# Patient Record
Sex: FEMALE | Race: BLACK OR AFRICAN AMERICAN | NOT HISPANIC OR LATINO
[De-identification: names, ages, dates, MRNs, and addresses within clinical notes are randomized per-mention and may not be internally consistent; named-entity substitution may affect disease eponyms.]

---
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
## 2020-10-20 ENCOUNTER — EMERGENCY (EMERGENCY)
Facility: HOSPITAL | Age: 20
LOS: 1 days | Discharge: HOME | End: 2020-10-20
Attending: STUDENT IN AN ORGANIZED HEALTH CARE EDUCATION/TRAINING PROGRAM
Payer: COMMERCIAL

## 2020-10-20 VITALS
HEART RATE: 116 BPM | DIASTOLIC BLOOD PRESSURE: 71 MMHG | SYSTOLIC BLOOD PRESSURE: 111 MMHG | RESPIRATION RATE: 22 BRPM | HEIGHT: 62.5 IN | TEMPERATURE: 101 F | OXYGEN SATURATION: 98 % | WEIGHT: 151.9 LBS

## 2020-10-20 VITALS
HEART RATE: 96 BPM | DIASTOLIC BLOOD PRESSURE: 78 MMHG | OXYGEN SATURATION: 100 % | SYSTOLIC BLOOD PRESSURE: 130 MMHG | RESPIRATION RATE: 18 BRPM

## 2020-10-20 LAB
ALBUMIN SERPL ELPH-MCNC: 4 G/DL — SIGNIFICANT CHANGE UP (ref 3.3–5)
ALP SERPL-CCNC: 88 U/L — SIGNIFICANT CHANGE UP (ref 40–120)
ALT FLD-CCNC: 21 U/L — SIGNIFICANT CHANGE UP (ref 10–45)
ANION GAP SERPL CALC-SCNC: 16 MMOL/L — SIGNIFICANT CHANGE UP (ref 5–17)
APPEARANCE UR: ABNORMAL
AST SERPL-CCNC: 24 U/L — SIGNIFICANT CHANGE UP (ref 10–40)
BACTERIA # UR AUTO: ABNORMAL
BASE EXCESS BLDV CALC-SCNC: 7 MMOL/L — HIGH (ref -2–2)
BASOPHILS # BLD AUTO: 0.04 K/UL — SIGNIFICANT CHANGE UP (ref 0–0.2)
BASOPHILS NFR BLD AUTO: 0.3 % — SIGNIFICANT CHANGE UP (ref 0–2)
BILIRUB SERPL-MCNC: 0.5 MG/DL — SIGNIFICANT CHANGE UP (ref 0.2–1.2)
BILIRUB UR-MCNC: NEGATIVE — SIGNIFICANT CHANGE UP
BUN SERPL-MCNC: <4 MG/DL — LOW (ref 7–23)
CA-I SERPL-SCNC: 1.13 MMOL/L — SIGNIFICANT CHANGE UP (ref 1.12–1.3)
CALCIUM SERPL-MCNC: 10.2 MG/DL — SIGNIFICANT CHANGE UP (ref 8.4–10.5)
CHLORIDE BLDV-SCNC: 97 MMOL/L — SIGNIFICANT CHANGE UP (ref 96–108)
CHLORIDE SERPL-SCNC: 94 MMOL/L — LOW (ref 96–108)
CO2 BLDV-SCNC: 30 MMOL/L — SIGNIFICANT CHANGE UP (ref 22–30)
CO2 SERPL-SCNC: 25 MMOL/L — SIGNIFICANT CHANGE UP (ref 22–31)
COLOR SPEC: SIGNIFICANT CHANGE UP
CREAT SERPL-MCNC: 0.8 MG/DL — SIGNIFICANT CHANGE UP (ref 0.5–1.3)
DIFF PNL FLD: NEGATIVE — SIGNIFICANT CHANGE UP
EOSINOPHIL # BLD AUTO: 0.23 K/UL — SIGNIFICANT CHANGE UP (ref 0–0.5)
EOSINOPHIL NFR BLD AUTO: 2 % — SIGNIFICANT CHANGE UP (ref 0–6)
EPI CELLS # UR: 4 /HPF — SIGNIFICANT CHANGE UP
GAS PNL BLDV: 133 MMOL/L — LOW (ref 135–145)
GAS PNL BLDV: SIGNIFICANT CHANGE UP
GAS PNL BLDV: SIGNIFICANT CHANGE UP
GLUCOSE BLDV-MCNC: 116 MG/DL — HIGH (ref 70–99)
GLUCOSE SERPL-MCNC: 108 MG/DL — HIGH (ref 70–99)
GLUCOSE UR QL: NEGATIVE — SIGNIFICANT CHANGE UP
HCG UR QL: NEGATIVE — SIGNIFICANT CHANGE UP
HCO3 BLDV-SCNC: 29 MMOL/L — SIGNIFICANT CHANGE UP (ref 21–29)
HCT VFR BLD CALC: 41.2 % — SIGNIFICANT CHANGE UP (ref 34.5–45)
HCT VFR BLDA CALC: 41 % — SIGNIFICANT CHANGE UP (ref 39–50)
HGB BLD CALC-MCNC: 13.5 G/DL — SIGNIFICANT CHANGE UP (ref 11.5–15.5)
HGB BLD-MCNC: 13.7 G/DL — SIGNIFICANT CHANGE UP (ref 11.5–15.5)
IMM GRANULOCYTES NFR BLD AUTO: 0.5 % — SIGNIFICANT CHANGE UP (ref 0–1.5)
KETONES UR-MCNC: ABNORMAL
LACTATE BLDV-MCNC: 2 MMOL/L — SIGNIFICANT CHANGE UP (ref 0.7–2)
LEUKOCYTE ESTERASE UR-ACNC: NEGATIVE — SIGNIFICANT CHANGE UP
LIDOCAIN IGE QN: 15 U/L — SIGNIFICANT CHANGE UP (ref 7–60)
LYMPHOCYTES # BLD AUTO: 1.53 K/UL — SIGNIFICANT CHANGE UP (ref 1–3.3)
LYMPHOCYTES # BLD AUTO: 13.3 % — SIGNIFICANT CHANGE UP (ref 13–44)
MCHC RBC-ENTMCNC: 28.2 PG — SIGNIFICANT CHANGE UP (ref 27–34)
MCHC RBC-ENTMCNC: 33.3 GM/DL — SIGNIFICANT CHANGE UP (ref 32–36)
MCV RBC AUTO: 84.9 FL — SIGNIFICANT CHANGE UP (ref 80–100)
MONOCYTES # BLD AUTO: 0.34 K/UL — SIGNIFICANT CHANGE UP (ref 0–0.9)
MONOCYTES NFR BLD AUTO: 2.9 % — SIGNIFICANT CHANGE UP (ref 2–14)
NEUTROPHILS # BLD AUTO: 9.34 K/UL — HIGH (ref 1.8–7.4)
NEUTROPHILS NFR BLD AUTO: 81 % — HIGH (ref 43–77)
NITRITE UR-MCNC: NEGATIVE — SIGNIFICANT CHANGE UP
NRBC # BLD: 0 /100 WBCS — SIGNIFICANT CHANGE UP (ref 0–0)
PCO2 BLDV: 33 MMHG — LOW (ref 35–50)
PH BLDV: 7.55 — HIGH (ref 7.35–7.45)
PH UR: 7 — SIGNIFICANT CHANGE UP (ref 5–8)
PLATELET # BLD AUTO: 440 K/UL — HIGH (ref 150–400)
PO2 BLDV: 26 MMHG — SIGNIFICANT CHANGE UP (ref 25–45)
POTASSIUM BLDV-SCNC: 3.5 MMOL/L — SIGNIFICANT CHANGE UP (ref 3.5–5.3)
POTASSIUM SERPL-MCNC: 3.6 MMOL/L — SIGNIFICANT CHANGE UP (ref 3.5–5.3)
POTASSIUM SERPL-SCNC: 3.6 MMOL/L — SIGNIFICANT CHANGE UP (ref 3.5–5.3)
PROT SERPL-MCNC: 8.4 G/DL — HIGH (ref 6–8.3)
PROT UR-MCNC: SIGNIFICANT CHANGE UP
RBC # BLD: 4.85 M/UL — SIGNIFICANT CHANGE UP (ref 3.8–5.2)
RBC # FLD: 13.4 % — SIGNIFICANT CHANGE UP (ref 10.3–14.5)
RBC CASTS # UR COMP ASSIST: 2 /HPF — SIGNIFICANT CHANGE UP (ref 0–4)
SAO2 % BLDV: 54 % — LOW (ref 67–88)
SARS-COV-2 RNA SPEC QL NAA+PROBE: SIGNIFICANT CHANGE UP
SODIUM SERPL-SCNC: 135 MMOL/L — SIGNIFICANT CHANGE UP (ref 135–145)
SP GR SPEC: 1.01 — LOW (ref 1.01–1.02)
UROBILINOGEN FLD QL: NEGATIVE — SIGNIFICANT CHANGE UP
WBC # BLD: 11.54 K/UL — HIGH (ref 3.8–10.5)
WBC # FLD AUTO: 11.54 K/UL — HIGH (ref 3.8–10.5)
WBC UR QL: 5 /HPF — SIGNIFICANT CHANGE UP (ref 0–5)

## 2020-10-20 PROCEDURE — 81025 URINE PREGNANCY TEST: CPT

## 2020-10-20 PROCEDURE — U0003: CPT

## 2020-10-20 PROCEDURE — 84295 ASSAY OF SERUM SODIUM: CPT

## 2020-10-20 PROCEDURE — 85018 HEMOGLOBIN: CPT

## 2020-10-20 PROCEDURE — 99284 EMERGENCY DEPT VISIT MOD MDM: CPT | Mod: 25

## 2020-10-20 PROCEDURE — 82947 ASSAY GLUCOSE BLOOD QUANT: CPT

## 2020-10-20 PROCEDURE — 99284 EMERGENCY DEPT VISIT MOD MDM: CPT

## 2020-10-20 PROCEDURE — 80053 COMPREHEN METABOLIC PANEL: CPT

## 2020-10-20 PROCEDURE — 83605 ASSAY OF LACTIC ACID: CPT

## 2020-10-20 PROCEDURE — 81001 URINALYSIS AUTO W/SCOPE: CPT

## 2020-10-20 PROCEDURE — 82330 ASSAY OF CALCIUM: CPT

## 2020-10-20 PROCEDURE — 85025 COMPLETE CBC W/AUTO DIFF WBC: CPT

## 2020-10-20 PROCEDURE — 83690 ASSAY OF LIPASE: CPT

## 2020-10-20 PROCEDURE — 84132 ASSAY OF SERUM POTASSIUM: CPT

## 2020-10-20 PROCEDURE — 71045 X-RAY EXAM CHEST 1 VIEW: CPT | Mod: 26

## 2020-10-20 PROCEDURE — 85014 HEMATOCRIT: CPT

## 2020-10-20 PROCEDURE — 71045 X-RAY EXAM CHEST 1 VIEW: CPT

## 2020-10-20 PROCEDURE — 82435 ASSAY OF BLOOD CHLORIDE: CPT

## 2020-10-20 PROCEDURE — 82803 BLOOD GASES ANY COMBINATION: CPT

## 2020-10-20 RX ORDER — ACETAMINOPHEN 500 MG
650 TABLET ORAL ONCE
Refills: 0 | Status: COMPLETED | OUTPATIENT
Start: 2020-10-20 | End: 2020-10-20

## 2020-10-20 RX ORDER — IBUPROFEN 200 MG
600 TABLET ORAL ONCE
Refills: 0 | Status: COMPLETED | OUTPATIENT
Start: 2020-10-20 | End: 2020-10-20

## 2020-10-20 RX ORDER — SODIUM CHLORIDE 9 MG/ML
1000 INJECTION, SOLUTION INTRAVENOUS ONCE
Refills: 0 | Status: COMPLETED | OUTPATIENT
Start: 2020-10-20 | End: 2020-10-20

## 2020-10-20 RX ORDER — ONDANSETRON 8 MG/1
4 TABLET, FILM COATED ORAL ONCE
Refills: 0 | Status: COMPLETED | OUTPATIENT
Start: 2020-10-20 | End: 2020-10-20

## 2020-10-20 RX ADMIN — Medication 650 MILLIGRAM(S): at 17:24

## 2020-10-20 RX ADMIN — Medication 650 MILLIGRAM(S): at 20:33

## 2020-10-20 RX ADMIN — Medication 600 MILLIGRAM(S): at 20:38

## 2020-10-20 RX ADMIN — ONDANSETRON 4 MILLIGRAM(S): 8 TABLET, FILM COATED ORAL at 17:24

## 2020-10-20 RX ADMIN — SODIUM CHLORIDE 2000 MILLILITER(S): 9 INJECTION, SOLUTION INTRAVENOUS at 20:25

## 2020-10-20 RX ADMIN — Medication 1 TABLET(S): at 21:16

## 2020-10-20 NOTE — ED PROVIDER NOTE - CLINICAL SUMMARY MEDICAL DECISION MAKING FREE TEXT BOX
DAVID Elliott: 21yo F, daily marijuana smoker, no other significant PMH/PSH hx p/w body aches, fever/chills, cough with SOB, poor appetite, n/v/d x 5 days. No sick contacts or recent travel. No abd pain. Recent negative COVID test a few days ago. Pt tachycardic + febrile upon arrival to ED, temp improved with tylenol by [facility]. Exam unremarkable except for dry oral mucosa. Consistent with viral syndrome. Plan for PO hydration and send COVID, awaiting labs and cxr sent by [facility], re-eval. DAVID Elliott: 21yo F, daily marijuana smoker, no other significant PMH/PSH hx p/w body aches, fever/chills, cough with SOB, poor appetite, n/v/d x 5 days. No sick contacts or recent travel. No abd pain. Recent negative COVID test a few days ago. Pt tachycardic + febrile upon arrival to ED, temp improved with tylenol by [facility]. Exam unremarkable except for dry oral mucosa. Consistent with viral syndrome. Plan for PO hydration and send COVID, awaiting labs and cxr ordered by [facility], re-eval.

## 2020-10-20 NOTE — ED PROVIDER NOTE - NSFOLLOWUPINSTRUCTIONS_ED_ALL_ED_FT
Rest. Stay well hydrated.   Take Tylenol (Acetaminophen) 650mg every 6 hours as needed for fever.   May alternate with Motrin (Ibuprofen) 600mg every 8hrs as needed. Take with food.     Take antibiotics as prescribed: Augmentin twice daily for 5 days.     Stop smoking marijuana.     Follow up with your Primary Care Provider upon discharge.   You may follow up with [facility]: call [phone] to make appointment.     Bring a copy of your test results (attached along with your discharge paperwork) with you to your appointment for further discussion, evaluation, and comparison with your prior results.    Please return to the Emergency Department immediately for any new, worsening, or concerning symptoms including persistent high fevers, unable to eat/drink, trouble breathing, or any other concerns.     -------------------------------------------------------------------------------------------        Community Acquired Pneumonia    WHAT YOU NEED TO KNOW:    Community-acquired pneumonia (CAP) is a lung infection that you get outside of a hospital or nursing home setting. Your lungs become inflamed and cannot work well. CAP may be caused by bacteria, viruses, or fungi.     The Lungs         DISCHARGE INSTRUCTIONS:    Seek care immediately if:   •You are confused and cannot think clearly.      •You have increased trouble breathing.      •Your lips or fingernails turn gray or blue.      Call your doctor if:   •Your symptoms do not get better, or they get worse.      •You are urinating less, or not at all.      •You have questions or concerns about your condition or care.      Medicines:   •Medicines may be given to treat a bacterial, viral, or fungal infection. You may also be given medicines to dilate your bronchial tubes to help you breathe more easily.      •Take your medicine as directed. Contact your healthcare provider if you think your medicine is not helping or if you have side effects. Tell him or her if you are allergic to any medicine. Keep a list of the medicines, vitamins, and herbs you take. Include the amounts, and when and why you take them. Bring the list or the pill bottles to follow-up visits. Carry your medicine list with you in case of an emergency.      Follow up with your doctor within 3 days or as directed: You may need another x-ray. Write down your questions so you remember to ask them during your visits.    Deep breathing and coughing: Deep breathing helps open the air passages in your lungs. Coughing helps bring up mucus from your lungs. Take a deep breath and hold the breath as long as you can. Then push the air out of your lungs with a deep, strong cough. Spit out any mucus you have coughed up. Take 10 deep breaths in a row every hour that you are awake. Remember to follow each deep breath with a cough.     Do not smoke or allow others to smoke around you: Nicotine and other chemicals in cigarettes and cigars can cause lung damage. Ask your healthcare provider for information if you currently smoke and need help to quit. E-cigarettes or smokeless tobacco still contain nicotine. Talk to your healthcare provider before you use these products.     Manage CAP at home:   •Breathe warm, moist air. This helps loosen mucus. Loosely place a warm, wet washcloth over your nose and mouth. A room humidifier may also help make the air moist.      •Drink liquids as directed. Ask your healthcare provider how much liquid to drink each day and which liquids to drink. Liquids help make mucus thin and easier to get out of your body.      •Gently tap your chest. This helps loosen mucus so it is easier to cough. Lie with your head lower than your chest several times a day and tap your chest.       •Get plenty of rest. Rest helps your body heal.       Prevent CAP:          •Wash your hands often. Wash your hands several times each day. Wash after you use the bathroom, change a child's diaper, and before you prepare or eat food. Use soap and water every time. Rub your soapy hands together, lacing your fingers. Wash the front and back of your hands, and in between your fingers. Use the fingers of one hand to scrub under the fingernails of the other hand. Wash for at least 20 seconds. Rinse with warm, running water for several seconds. Then dry your hands with a clean towel or paper towel. Use hand  that contains alcohol if soap and water are not available. Do not touch your eyes, nose, or mouth without washing your hands first.  Handwashing           •Cover a sneeze or cough. Use a tissue that covers your mouth and nose. Throw the tissue away in a trash can right away. Use the bend of your arm if a tissue is not available. Wash your hands well with soap and water or use a hand . Do not stand close to anyone who is sneezing or coughing.      •Clean surfaces often. Clean doorknobs, countertops, cell phones, and other surfaces that are touched often. Use a disinfecting wipe, a single-use sponge, or a cloth you can wash and reuse. Use disinfecting  if you do not have wipes. You can create a disinfecting  by mixing 1 part bleach with 10 parts water.      •Try to avoid people who have a cold or the flu. If you are sick, stay away from others as much as possible.      •Ask about vaccines you may need. You may need a vaccine to help prevent pneumonia. Get an influenza (flu) vaccine every year as soon as recommended, usually in September or October. Your healthcare provider can tell you if you should get any other vaccines, and when to get them.

## 2020-10-20 NOTE — ED ADULT TRIAGE NOTE - CHIEF COMPLAINT QUOTE
Pt sent by GI for work up, pt has had fever, cough, GI Sx for over a week with negative COVID test at .

## 2020-10-20 NOTE — ED PROVIDER NOTE - RAPID ASSESSMENT
20y F presents to the ED c/o cough with SOB and fever (temp: 101.4F) x 1 week. + Abd pain with N/V/D and decreased appetite. Saw GI today, who sent pt into ED for evaluation. Pt with tachycardia and  during teletriage evaluation. Reports having a negative COVID 19 PCR a few days ago. 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 Denies known sick contact or recent travel. NKDA.    **Pt seen in waiting room by Cory ONTIVEROS), documentation completed by Mayuri Lagunas. Pt to be sent to main ED for further evaluation - all orders placed to be followed by MD in the main ED** 20y F presents to the ED c/o cough with SOB and fever (temp: 101.4F) x 1 week. + Abd pain with N/V/D and decreased appetite. Saw GI today, who sent pt into ED for evaluation. Pt with tachycardia and  during teletriage evaluation. Reports having a negative COVID 19 PCR a few days ago. 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 Denies known sick contact or recent travel. NKDA.    **Pt seen in waiting room by Travon ONTIVEROS), documentation completed by Mayuri Lagunas. Pt to be sent to main ED for further evaluation - all orders placed to be followed by MD in the main ED**

## 2020-10-20 NOTE — ED PROVIDER NOTE - PROGRESS NOTE DETAILS
Pt tolerating PO, feeling a little better. Pending UA/Upreg and completion of IVF. - Char Elliott PA-C Xray + RLL PNA, plan for abx and d/c. COVID pending.  All results and d/c instructions d/w patient, and advised PCP f/u. - Char Elliott PA-C Xray + RLL PNA, plan for abx and d/c. COVID pending.  All results and d/c instructions d/w patient, and advised PCP f/u.  Discussed proper hand hygiene, contact precautions, and advised self quarantine until covid results. - ALEJANDRO SoodC

## 2020-10-20 NOTE — ED PROVIDER NOTE - ATTENDING CONTRIBUTION TO CARE
20 year old female with no significant past medical history presented to ED with 5 days of fever, cough and non-bloody watery diarrhea. Also reported generalized weakness. No chest pain, shortness of breath. Was seen by her GI provider and sent to ED today due to dehydration. Patient is able to tolerate PO, had apple sauce this morning.  On exam: Pt is resting comfortably. Lungs CTA, abdomen soft, non-tender, non-distended. Dry mucous membrane. Impression: likely viral syndrome. Plan: labs, zofran, PO hydration. Reassess

## 2020-10-20 NOTE — ED PROVIDER NOTE - OBJECTIVE STATEMENT
21yo F with no PMH, presenting with body aches, fever/chills, cough with SOB, poor appetite, n/v/d x 5 days. Pt reports tolerating some apple sauce and fluids but ate nothing today. + lightheadedness. Also with a few episodes of nonbloody vomiting and diarrhea over last few days. No diarrhea today. Of note, had negative COVID and flu tests a few days ago. Saw GI today (for first time), who sent pt into ED for further eval. 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 Of note, pt is a daily marijuana smoker. Denies any recent travel or sick contacts. Denies sore throat, CP, abdominal pain, urinary symptoms. 19yo F with no PMH, presenting with body aches, fever/chills, cough with SOB, poor appetite, n/v/d x 5 days. Pt reports tolerating some apple sauce and fluids but ate nothing today. + lightheadedness. Also with a few episodes of nonbloody vomiting and diarrhea over last few days. No diarrhea today. Of note, had negative COVID and flu tests a few days ago. Saw GI today (for first time), who sent pt into ED for further eval. 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 Of note, pt is a daily marijuana smoker. Denies any recent travel or sick contacts. Denies sore throat, CP, abdominal pain, urinary symptoms, h/o abdominal surgeries. 19yo F with no PMH, presenting with body aches, fever/chills, cough with SOB, poor appetite, n/v/d x 5 days. Pt reports tolerating some apple sauce and fluids but ate nothing today. + lightheadedness. Also with a few episodes of nonbloody vomiting and diarrhea over last few days. No diarrhea today. Of note, had negative COVID and flu tests a few days ago. Saw GI today (for first time), who sent pt into ED for further eval. 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 Of note, pt is a daily marijuana smoker. Denies any recent travel or sick contacts. Denies sore throat, CP, abdominal pain, urinary symptoms, rash, h/o abdominal surgeries.

## 2020-10-20 NOTE — ED ADULT NURSE NOTE - OBJECTIVE STATEMENT
19 yo f no pertinent pmhx presents to the ED with c/o body aches, fevers and chills as well as decreased po intake for the past 5 days. Pt reports she has had no interaction with sick contact, stays at home, reports taking online classes which limits her interaction with other people. Pt reports today she was able to tolerate some apple sauce and fluids but nothing heavy. Pt reports some vomiting and diarrhea over the past couple of days. States she went to  reports negative covid test on friday, reports on monday she went to her pcp still pending results for the covid.

## 2020-10-20 NOTE — ED PROVIDER NOTE - PATIENT PORTAL LINK FT
You can access the FollowMyHealth Patient Portal offered by [facility] by registering at the following website: [email or web address]. By joining [employer]’s FollowMyHealth portal, you will also be able to view your health information using other applications (apps) compatible with our system.

## 2020-12-16 PROBLEM — J02.0 STREP PHARYNGITIS: Status: RESOLVED | Noted: 2018-03-28 | Resolved: 2020-12-16

## 2021-07-04 ENCOUNTER — TRANSCRIPTION ENCOUNTER (OUTPATIENT)
Age: 21
End: 2021-07-04

## 2021-10-06 PROBLEM — Z28.21 IMMUNIZATION REFUSED: Status: ACTIVE | Noted: 2017-10-03

## 2023-03-01 ENCOUNTER — EMERGENCY (EMERGENCY)
Facility: HOSPITAL | Age: 23
LOS: 1 days | Discharge: HOME | End: 2023-03-01
Attending: EMERGENCY MEDICINE
Payer: COMMERCIAL

## 2023-03-01 VITALS
RESPIRATION RATE: 18 BRPM | HEART RATE: 84 BPM | DIASTOLIC BLOOD PRESSURE: 76 MMHG | HEIGHT: 62 IN | SYSTOLIC BLOOD PRESSURE: 115 MMHG | WEIGHT: 119.05 LBS | OXYGEN SATURATION: 100 % | TEMPERATURE: 98 F

## 2023-03-01 VITALS
TEMPERATURE: 98 F | OXYGEN SATURATION: 98 % | SYSTOLIC BLOOD PRESSURE: 117 MMHG | RESPIRATION RATE: 18 BRPM | DIASTOLIC BLOOD PRESSURE: 79 MMHG | HEART RATE: 65 BPM

## 2023-03-01 PROBLEM — Z78.9 OTHER SPECIFIED HEALTH STATUS: Chronic | Status: ACTIVE | Noted: 2020-10-20

## 2023-03-01 LAB
ALBUMIN SERPL ELPH-MCNC: 4.3 G/DL — SIGNIFICANT CHANGE UP (ref 3.3–5)
ALP SERPL-CCNC: 52 U/L — SIGNIFICANT CHANGE UP (ref 40–120)
ALT FLD-CCNC: 13 U/L — SIGNIFICANT CHANGE UP (ref 10–45)
ANION GAP SERPL CALC-SCNC: 13 MMOL/L — SIGNIFICANT CHANGE UP (ref 5–17)
APPEARANCE UR: CLEAR — SIGNIFICANT CHANGE UP
AST SERPL-CCNC: 20 U/L — SIGNIFICANT CHANGE UP (ref 10–40)
BILIRUB SERPL-MCNC: 0.3 MG/DL — SIGNIFICANT CHANGE UP (ref 0.2–1.2)
BILIRUB UR-MCNC: NEGATIVE — SIGNIFICANT CHANGE UP
BLD GP AB SCN SERPL QL: NEGATIVE — SIGNIFICANT CHANGE UP
BUN SERPL-MCNC: 14 MG/DL — SIGNIFICANT CHANGE UP (ref 7–23)
CALCIUM SERPL-MCNC: 9.6 MG/DL — SIGNIFICANT CHANGE UP (ref 8.4–10.5)
CHLORIDE SERPL-SCNC: 103 MMOL/L — SIGNIFICANT CHANGE UP (ref 96–108)
CO2 SERPL-SCNC: 23 MMOL/L — SIGNIFICANT CHANGE UP (ref 22–31)
COLOR SPEC: COLORLESS — SIGNIFICANT CHANGE UP
CREAT SERPL-MCNC: 0.77 MG/DL — SIGNIFICANT CHANGE UP (ref 0.5–1.3)
DIFF PNL FLD: NEGATIVE — SIGNIFICANT CHANGE UP
EGFR: 112 ML/MIN/1.73M2 — SIGNIFICANT CHANGE UP
GLUCOSE SERPL-MCNC: 98 MG/DL — SIGNIFICANT CHANGE UP (ref 70–99)
GLUCOSE UR QL: NEGATIVE — SIGNIFICANT CHANGE UP
HCG SERPL-ACNC: <2 MIU/ML — SIGNIFICANT CHANGE UP
HCG UR QL: NEGATIVE — SIGNIFICANT CHANGE UP
HCT VFR BLD CALC: 38.9 % — SIGNIFICANT CHANGE UP (ref 34.5–45)
HGB BLD-MCNC: 12.5 G/DL — SIGNIFICANT CHANGE UP (ref 11.5–15.5)
KETONES UR-MCNC: ABNORMAL
LEUKOCYTE ESTERASE UR-ACNC: NEGATIVE — SIGNIFICANT CHANGE UP
MCHC RBC-ENTMCNC: 28.9 PG — SIGNIFICANT CHANGE UP (ref 27–34)
MCHC RBC-ENTMCNC: 32.1 GM/DL — SIGNIFICANT CHANGE UP (ref 32–36)
MCV RBC AUTO: 90 FL — SIGNIFICANT CHANGE UP (ref 80–100)
NITRITE UR-MCNC: NEGATIVE — SIGNIFICANT CHANGE UP
NRBC # BLD: 0 /100 WBCS — SIGNIFICANT CHANGE UP (ref 0–0)
PH UR: 5.5 — SIGNIFICANT CHANGE UP (ref 5–8)
PLATELET # BLD AUTO: 193 K/UL — SIGNIFICANT CHANGE UP (ref 150–400)
POTASSIUM SERPL-MCNC: 3.7 MMOL/L — SIGNIFICANT CHANGE UP (ref 3.5–5.3)
POTASSIUM SERPL-SCNC: 3.7 MMOL/L — SIGNIFICANT CHANGE UP (ref 3.5–5.3)
PROT SERPL-MCNC: 6.8 G/DL — SIGNIFICANT CHANGE UP (ref 6–8.3)
PROT UR-MCNC: NEGATIVE — SIGNIFICANT CHANGE UP
RBC # BLD: 4.32 M/UL — SIGNIFICANT CHANGE UP (ref 3.8–5.2)
RBC # FLD: 13.6 % — SIGNIFICANT CHANGE UP (ref 10.3–14.5)
RH IG SCN BLD-IMP: POSITIVE — SIGNIFICANT CHANGE UP
SODIUM SERPL-SCNC: 139 MMOL/L — SIGNIFICANT CHANGE UP (ref 135–145)
SP GR SPEC: 1.01 — LOW (ref 1.01–1.02)
UROBILINOGEN FLD QL: NEGATIVE — SIGNIFICANT CHANGE UP
WBC # BLD: 4.16 K/UL — SIGNIFICANT CHANGE UP (ref 3.8–10.5)
WBC # FLD AUTO: 4.16 K/UL — SIGNIFICANT CHANGE UP (ref 3.8–10.5)

## 2023-03-01 PROCEDURE — 99284 EMERGENCY DEPT VISIT MOD MDM: CPT | Mod: 25

## 2023-03-01 PROCEDURE — 74177 CT ABD & PELVIS W/CONTRAST: CPT | Mod: 26,MA

## 2023-03-01 PROCEDURE — 99285 EMERGENCY DEPT VISIT HI MDM: CPT

## 2023-03-01 PROCEDURE — 84702 CHORIONIC GONADOTROPIN TEST: CPT

## 2023-03-01 PROCEDURE — 93975 VASCULAR STUDY: CPT | Mod: 26

## 2023-03-01 PROCEDURE — 86901 BLOOD TYPING SEROLOGIC RH(D): CPT

## 2023-03-01 PROCEDURE — 86900 BLOOD TYPING SEROLOGIC ABO: CPT

## 2023-03-01 PROCEDURE — 83690 ASSAY OF LIPASE: CPT

## 2023-03-01 PROCEDURE — 85027 COMPLETE CBC AUTOMATED: CPT

## 2023-03-01 PROCEDURE — 96374 THER/PROPH/DIAG INJ IV PUSH: CPT

## 2023-03-01 PROCEDURE — 81025 URINE PREGNANCY TEST: CPT

## 2023-03-01 PROCEDURE — 74177 CT ABD & PELVIS W/CONTRAST: CPT | Mod: MA

## 2023-03-01 PROCEDURE — 81003 URINALYSIS AUTO W/O SCOPE: CPT

## 2023-03-01 PROCEDURE — 93975 VASCULAR STUDY: CPT

## 2023-03-01 PROCEDURE — 76830 TRANSVAGINAL US NON-OB: CPT

## 2023-03-01 PROCEDURE — 96375 TX/PRO/DX INJ NEW DRUG ADDON: CPT

## 2023-03-01 PROCEDURE — 80053 COMPREHEN METABOLIC PANEL: CPT

## 2023-03-01 PROCEDURE — 86850 RBC ANTIBODY SCREEN: CPT

## 2023-03-01 PROCEDURE — 36415 COLL VENOUS BLD VENIPUNCTURE: CPT

## 2023-03-01 PROCEDURE — 76830 TRANSVAGINAL US NON-OB: CPT | Mod: 26

## 2023-03-01 PROCEDURE — 81001 URINALYSIS AUTO W/SCOPE: CPT

## 2023-03-01 RX ORDER — ACETAMINOPHEN 500 MG
1000 TABLET ORAL ONCE
Refills: 0 | Status: COMPLETED | OUTPATIENT
Start: 2023-03-01 | End: 2023-03-01

## 2023-03-01 RX ORDER — MORPHINE SULFATE 50 MG/1
2 CAPSULE, EXTENDED RELEASE ORAL ONCE
Refills: 0 | Status: DISCONTINUED | OUTPATIENT
Start: 2023-03-01 | End: 2023-03-01

## 2023-03-01 RX ADMIN — MORPHINE SULFATE 2 MILLIGRAM(S): 50 CAPSULE, EXTENDED RELEASE ORAL at 04:51

## 2023-03-01 RX ADMIN — Medication 400 MILLIGRAM(S): at 04:03

## 2023-03-01 NOTE — ED PROVIDER NOTE - NSFOLLOWUPINSTRUCTIONS_ED_ALL_ED_FT
Please follow up with your OBGYN; a referral was placed for an OBGYN appointment if you do not already have one.  Your ultrasound showed possible fluid in your fallopian tube; this should be followed-up by an OBGYN in the next 7-10 days.    Tylenol 500 mg (one extra strength pill) four times a day as needed for pain  Motrin 400 mg (two regular strength pills) every 8 hours as needed for pain     Please return to the ER for severe pain, vomiting, fevers, or ANY other concerns

## 2023-03-01 NOTE — ED PROVIDER NOTE - ATTENDING CONTRIBUTION TO CARE
MD Min:  patient seen and evaluated personally.   I agree with the History & Physical,  Impression & Plan other than what was detailed in my note.  MD Min  21 y/o f woke from sleep w/ llq pain started two hours ago ,woke from sleep, currently on period, has cramps but feels different, no hx of k stones, g stones, no f/c n/v, no urinary symptoms, no hx of sti, no discharge, afebrile vitals stable  appears in pain NC/AT,  conjunctiva non conjected, sclera anicteric, moist mucous membranes, neck supple, heart sounds, normal, no mrg, lungs cta b/l no wrr, abd soft non distended w/ mild in llq tenderness, no visual deformities of extremities, axox3, normal mood and affect, plan for hcg, pelvic, cbc, cmp, lipase, tvus, re evaluate.

## 2023-03-01 NOTE — ED PROVIDER NOTE - PHYSICAL EXAMINATION
Brii Ackerman PA-C: pelvic exam done by me. chaperoned by medicine resident, Elliot. patient has minimal bleeding on exam (currently on her menstrual period) as well as ttp to left adnexa. no CMT.

## 2023-03-01 NOTE — ED ADULT NURSE NOTE - OBJECTIVE STATEMENT
22yF A&Ox4 Ambulatory No significant PMHX Father at bedside presents to the ED c/o sharp 8/10 L lower abd pain x 1 day. pt endorses symptoms of nausea. last BM and urine output was today. pt denies new diet, f/c/d, sick contacts.

## 2023-03-01 NOTE — ED PROVIDER NOTE - CLINICAL SUMMARY MEDICAL DECISION MAKING FREE TEXT BOX
21yo F with no significant PMH presenting with LLQ abdominal pain. Acute pain for 2 hours. Given acuteness of pain, concern for ovarian torsion vs ectopic pregnancy vs atypical pancreatitis. Will check cbc, cmp, tvus and lipase.

## 2023-03-01 NOTE — ED ADULT NURSE NOTE - HISTORY OF COVID-19 VACCINATION
Problem: OT General Care Plan  Goal: OT Goal 1    Attended <20 minutes in both occupational therapy groups this date. Content and mildly cooperative however excused self early both sessions. No Charge.     Pt would benefit from continued engagement in OT groups that support healthy recovery, specifically exploration of positive coping skills for symptom management/relapse prevention. Plan to provide graded occupation-based activities for increased success and ongoing assessment.     Outcome: No Change      Yes

## 2023-03-01 NOTE — ED PROVIDER NOTE - PATIENT PORTAL LINK FT
You can access the FollowMyHealth Patient Portal offered by [facility] by registering at the following website: [email or web address]. By joining [employer]’s FollowMyHealth portal, you will also be able to view your health information using other applications (apps) compatible with our system.

## 2023-03-01 NOTE — ED PROVIDER NOTE - OBJECTIVE STATEMENT
21yo F presenting with abdominal pain. 21yo F presenting with abdominal pain. Pain began suddenly 2 hours ago, woke patient up from sleep. Pain has been persistent with relief at times for 23yo F presenting with abdominal pain. Pain began suddenly 2 hours ago, woke patient up from sleep. Pain has been persistent with relief at times for few seconds to minutes before it returns. Pain primarily centered around left lower abdomen. Patient visibly in pain, doubled over during exam. Patient notes nausea due to pain. Denies chest pain, dyspnea. 21yo F presenting with abdominal pain. Pain began suddenly 2 hours ago, woke patient up from sleep. Pain has been persistent with relief at times for few seconds to minutes before it returns. Pain primarily centered around left lower abdomen. Patient visibly in pain, doubled over during exam. Patient notes nausea due to pain. Denies chest pain, dyspnea.    No meds, no significant PMH, no supplements. No tobacco use. Social etoh use. Smokes marijuana couple times/week.

## 2023-03-01 NOTE — ED PROVIDER NOTE - PROGRESS NOTE DETAILS
Attending Mechelle:  spoke w/ us to expedite us to screen for torsion Attending Mechelle:  called radiology to expedite read, they informed me study not complete but will look at it shortly and call me back if torsion Abd pain resolved, CT and US non-actionable in ED, labs non-actionable.  Pt to be made aware of questionable hydrosalpinx.  Strict return precautions to be discussed.  Will d/c.  --BMM

## 2023-03-01 NOTE — ED ADULT NURSE REASSESSMENT NOTE - NS ED NURSE REASSESS COMMENT FT1
Received pt in assigned room a&xo3 pt states she initially had abd pain, currently has no complaints of pain, denies any fever, n/v or urinary s/s, IVL intact, states she wants to go home, resting comfortably

## 2023-07-25 NOTE — ED ADULT TRIAGE NOTE - CCCP TRG CHIEF CMPLNT
1.  Please return to clinic at your next scheduled visit.  Contact the clinic ([phone]) at least 24 hours prior in the event you need to cancel.  2.  Do no harm to yourself or others.    3.  Avoid alcohol and drugs.    4.  Take all medications as prescribed.  Please contact the clinic with any concerns. If you are in need of medication refills, please call the clinic at [phone].    5. Should you want to get in touch with your provider, Dr. Riki Peterson, please utilize [employer] or contact the office ([phone]), and staff will be able to page Dr. Peterson directly.  6.  In the event you have personal crisis, contact the following crisis numbers: Suicide Prevention Hotline 1-717.189.5178; TATE Helpline 6-682-845-TATE; [facility] Emergency Room [phone]; text HELLO to 461090; or 981.                fever

## 2023-11-01 NOTE — ED PROVIDER NOTE - EYES, MLM
Patient has had a sore throat for a couple of days now and tonsils are swollen. There was strep exposure in class so mom would like patient to be seen today.    Clear bilaterally, pupils equal, round and reactive to light.

## 2023-11-13 ENCOUNTER — EMERGENCY (EMERGENCY)
Facility: HOSPITAL | Age: 23
LOS: 0 days | Discharge: HOME | End: 2023-11-13
Attending: EMERGENCY MEDICINE
Payer: COMMERCIAL

## 2023-11-13 VITALS
HEART RATE: 90 BPM | TEMPERATURE: 98 F | RESPIRATION RATE: 18 BRPM | OXYGEN SATURATION: 98 % | SYSTOLIC BLOOD PRESSURE: 110 MMHG | DIASTOLIC BLOOD PRESSURE: 74 MMHG

## 2023-11-13 VITALS
TEMPERATURE: 98 F | SYSTOLIC BLOOD PRESSURE: 117 MMHG | WEIGHT: 117.95 LBS | RESPIRATION RATE: 18 BRPM | OXYGEN SATURATION: 100 % | DIASTOLIC BLOOD PRESSURE: 74 MMHG | HEIGHT: 62.5 IN | HEART RATE: 77 BPM

## 2023-11-13 DIAGNOSIS — J06.9 ACUTE UPPER RESPIRATORY INFECTION, UNSPECIFIED: ICD-10-CM

## 2023-11-13 DIAGNOSIS — R05.9 COUGH, UNSPECIFIED: ICD-10-CM

## 2023-11-13 DIAGNOSIS — R09.81 NASAL CONGESTION: ICD-10-CM

## 2023-11-13 DIAGNOSIS — Z20.822 CONTACT WITH AND (SUSPECTED) EXPOSURE TO COVID-19: ICD-10-CM

## 2023-11-13 DIAGNOSIS — R11.0 NAUSEA: ICD-10-CM

## 2023-11-13 LAB
FLUAV AG NPH QL: SIGNIFICANT CHANGE UP
FLUAV AG NPH QL: SIGNIFICANT CHANGE UP
FLUBV AG NPH QL: SIGNIFICANT CHANGE UP
FLUBV AG NPH QL: SIGNIFICANT CHANGE UP
HCG UR QL: NEGATIVE — SIGNIFICANT CHANGE UP
HCG UR QL: NEGATIVE — SIGNIFICANT CHANGE UP
RAPID RVP RESULT: SIGNIFICANT CHANGE UP
RAPID RVP RESULT: SIGNIFICANT CHANGE UP
SARS-COV-2 RNA SPEC QL NAA+PROBE: SIGNIFICANT CHANGE UP

## 2023-11-13 PROCEDURE — 99284 EMERGENCY DEPT VISIT MOD MDM: CPT

## 2023-11-13 PROCEDURE — 71046 X-RAY EXAM CHEST 2 VIEWS: CPT | Mod: 26

## 2023-11-13 RX ORDER — ONDANSETRON 8 MG/1
4 TABLET, FILM COATED ORAL ONCE
Refills: 0 | Status: COMPLETED | OUTPATIENT
Start: 2023-11-13 | End: 2023-11-13

## 2023-11-13 RX ORDER — ONDANSETRON 8 MG/1
1 TABLET, FILM COATED ORAL
Qty: 1 | Refills: 0
Start: 2023-11-13 | End: 2023-11-15

## 2023-11-13 RX ADMIN — ONDANSETRON 4 MILLIGRAM(S): 8 TABLET, FILM COATED ORAL at 08:44

## 2023-11-13 RX ADMIN — ONDANSETRON 4 MILLIGRAM(S): 8 TABLET, FILM COATED ORAL at 05:32

## 2023-11-13 NOTE — ED PROVIDER NOTE - GASTROINTESTINAL [-], MLM
7/18/2019 CYSTOSCOPY AT [facility]–– local anesthesia    Findings of cystoscopy–– bladder 1+ trabeculated, otherwise negative; no obvious disease process seen. Performed for work-up of recurrent UTI associated with intercourse; also microhematuria. no abdominal pain

## 2023-11-13 NOTE — ED ADULT NURSE NOTE - NSFALLUNIVINTERV_ED_ALL_ED
Bed/Stretcher in lowest position, wheels locked, appropriate side rails in place/Call bell, personal items and telephone in reach/Instruct patient to call for assistance before getting out of bed/chair/stretcher/Non-slip footwear applied when patient is off stretcher/[place] to call system/Physically safe environment - no spills, clutter or unnecessary equipment/Purposeful proactive rounding/Room/bathroom lighting operational, light cord in reach

## 2023-11-13 NOTE — ED ADULT TRIAGE NOTE - GLASGOW COMA SCALE: BEST MOTOR RESPONSE, MLM
(M6) obeys commands Ivermectin Counseling:  Patient instructed to take medication on an empty stomach with a full glass of water.  Patient informed of potential adverse effects including but not limited to nausea, diarrhea, dizziness, itching, and swelling of the extremities or lymph nodes.  The patient verbalized understanding of the proper use and possible adverse effects of ivermectin.  All of the patient's questions and concerns were addressed.

## 2023-11-13 NOTE — ED PROVIDER NOTE - CLINICAL SUMMARY MEDICAL DECISION MAKING FREE TEXT BOX
URI with cough and nausea benign exam likely viral etiology despite test ordered by night doc.  Will re-send seab for flu/covid, check CXR, give nausea medication and Re-eval. URI with cough and nausea benign exam likely viral etiology despite test ordered by night doc.  Will re-send seab for flu/covid, check CXR, give nausea medication and Re-eval.    CXR negative.  Upreg negative.  No vomiting episodes in the ER.  Supportive care discussed.

## 2023-11-13 NOTE — ED PROVIDER NOTE - PATIENT PORTAL LINK FT
You can access the FollowMyHealth Patient Portal offered by [facility] by registering at the following website: [email or web address]. By joining "[employer]’s FollowMyHealth portal, you will also be able to view your health information using other applications (apps) compatible with our system.

## 2023-11-13 NOTE — ED ADULT NURSE NOTE - OBJECTIVE STATEMENT
Pt is AOx4 c/o "excessive spitting". Pt says "I haven't been able to stop spitting up since 10pm". Pt endorses one episode of NBNB emesis. C/o abd pain due to spitting up. No pertinent med hx

## 2023-11-13 NOTE — ED PROVIDER NOTE - OBJECTIVE STATEMENT
This patient is a 23 year old woman who presents to the ER c/o fever, malaise, nasal congestion, body aches and cough x 3 days.  She denies sick contacts and recent travel.  Today she developed nausea with increase saliva production and spitting up.  She denies abdominal pain.  No diarrhea, dysuria, and hematuria.  Patient states that her symptoms are similar to when she had PNA a couple years ago except she does not have SOB.

## 2023-11-13 NOTE — ED ADULT NURSE NOTE - CAS TRG GEN SKIN CONDITION
Patient Education        Sinusitis in Children: Care Instructions  Your Care Instructions     Sinusitis is an infection of the lining of the sinus cavities in your child's head. Sinusitis often follows a cold and causes pain and pressure in the head and face. In most cases, sinusitis gets better on its own in 1 to 2 weeks. But some mild symptoms may last for several weeks. Sometimes antibiotics are needed. Follow-up care is a key part of your child's treatment and safety. Be sure to make and go to all appointments, and call your doctor if your child is having problems. It's also a good idea to know your child's test results and keep a list of the medicines your child takes. How can you care for your child at home? · Give acetaminophen (Tylenol) or ibuprofen (Advil, Motrin) for fever, pain, or fussiness. Read and follow all instructions on the label. Do not give aspirin to anyone younger than 20. It has been linked to Reye syndrome, a serious illness. · If the doctor prescribed antibiotics for your child, give them as directed. Do not stop using them just because your child feels better. Your child needs to take the full course of antibiotics. · Be careful with cough and cold medicines. Don't give them to children younger than 6, because they don't work for children that age and can even be harmful. For children 6 and older, always follow all the instructions carefully. Make sure you know how much medicine to give and how long to use it. And use the dosing device if one is included. · Be careful when giving your child over-the-counter cold or flu medicines and Tylenol at the same time. Many of these medicines have acetaminophen, which is Tylenol. Read the labels to make sure that you are not giving your child more than the recommended dose. Too much acetaminophen (Tylenol) can be harmful. · Make sure your child rests. Keep your child home if he or she has a fever.   · If your child has problems breathing because of a stuffy nose, squirt a few saline (saltwater) nasal drops in one nostril. For older children, have your child blow his or her nose. Repeat for the other nostril. For infants, put a drop or two in one nostril. Using a soft rubber suction bulb, squeeze air out of the bulb, and gently place the tip of the bulb inside the baby's nose. Relax your hand to suck the mucus from the nose. Repeat in the other nostril. · Place a humidifier by your child's bed or close to your child. This may make it easier for your child to breathe. Follow the directions for cleaning the machine. · Put a hot, wet towel or a warm gel pack on your child's face 3 or 4 times a day for 5 to 10 minutes each time. Always check the pack to make sure it is not too hot before you place it on your child's face. · Keep your child away from smoke. Do not smoke or let anyone else smoke around your child or in your house. · Ask your doctor about using nasal sprays, decongestants, or antihistamines. When should you call for help? Call your doctor now or seek immediate medical care if:    · Your child has new or worse swelling or redness in the face or around the eyes.     · Your child has a new or higher fever. Watch closely for changes in your child's health, and be sure to contact your doctor if:    · Your child has new or worse facial pain.     · The mucus from your child's nose becomes thicker (like pus) or has new blood in it.     · Your child is not getting better as expected. Where can you learn more? Go to https://[employer]. org and sign in to your [employer] account. Enter W229 in the [employer] box to learn more about \"Sinusitis in Children: Care Instructions. \"     If you do not have an account, please click on the \"Sign Up Now\" link. Current as of: December 2, 2020               Content Version: 12.8  © 3598-0495 [employer]. Care instructions adapted under license by [facility] ([facility]). If you have questions about a medical condition or this instruction, always ask your healthcare professional. [place] [address] any warranty or liability for your use of this information. [place]

## 2023-11-13 NOTE — ED ADULT NURSE NOTE - CHIEF COMPLAINT QUOTE
No PMH  C/o nasal congestion, general malaise, cough for last 2-3 days. Vomited once last night, spitting frequently, denies fever, chills, nausea, sick contact . Speaks full complete sentences, unlabored breathing on RA.

## 2023-11-13 NOTE — ED ADULT TRIAGE NOTE - CHIEF COMPLAINT QUOTE
No PMH  C/o nasal congestion, general malaise, cough for last 2-3 days. Vomited once last night, spitting frequently, denies fever, chills, sick contact . Speaks full complete sentences, unlabored breathing on RA. No PMH  C/o nasal congestion, general malaise, cough for last 2-3 days. Vomited once last night, spitting frequently, denies fever, chills, nausea, sick contact . Speaks full complete sentences, unlabored breathing on RA.